# Patient Record
Sex: FEMALE | Race: WHITE | NOT HISPANIC OR LATINO
[De-identification: names, ages, dates, MRNs, and addresses within clinical notes are randomized per-mention and may not be internally consistent; named-entity substitution may affect disease eponyms.]

---

## 2018-06-08 ENCOUNTER — RESULT REVIEW (OUTPATIENT)
Age: 53
End: 2018-06-08

## 2019-06-28 ENCOUNTER — RESULT REVIEW (OUTPATIENT)
Age: 54
End: 2019-06-28

## 2019-11-13 PROBLEM — Z00.00 ENCOUNTER FOR PREVENTIVE HEALTH EXAMINATION: Status: ACTIVE | Noted: 2019-11-13

## 2019-11-18 ENCOUNTER — APPOINTMENT (OUTPATIENT)
Dept: OTOLARYNGOLOGY | Facility: CLINIC | Age: 54
End: 2019-11-18
Payer: COMMERCIAL

## 2019-11-18 VITALS
HEIGHT: 64 IN | BODY MASS INDEX: 23.05 KG/M2 | HEART RATE: 82 BPM | WEIGHT: 135 LBS | DIASTOLIC BLOOD PRESSURE: 96 MMHG | SYSTOLIC BLOOD PRESSURE: 152 MMHG

## 2019-11-18 DIAGNOSIS — H90.3 SENSORINEURAL HEARING LOSS, BILATERAL: ICD-10-CM

## 2019-11-18 DIAGNOSIS — H81.20 VESTIBULAR NEURONITIS, UNSPECIFIED EAR: ICD-10-CM

## 2019-11-18 DIAGNOSIS — M26.609 UNSPECIFIED TEMPOROMANDIBULAR JOINT DISORDER: ICD-10-CM

## 2019-11-18 DIAGNOSIS — H92.01 OTALGIA, RIGHT EAR: ICD-10-CM

## 2019-11-18 PROCEDURE — 99203 OFFICE O/P NEW LOW 30 MIN: CPT | Mod: 25

## 2019-11-18 PROCEDURE — 92557 COMPREHENSIVE HEARING TEST: CPT

## 2019-11-18 PROCEDURE — 92567 TYMPANOMETRY: CPT

## 2019-11-18 PROCEDURE — 92563 TONE DECAY HEARING TEST: CPT

## 2019-11-18 NOTE — ASSESSMENT
[FreeTextEntry1] : Patient with balance issues.  Negative exam except likely mild tmj.  Recommended vng and pending result may need imaging or neurology eval.  Also discussed warm compresses for tmj and advil.  If all negative also feel problem may be mild vestibular neuronitis.  follow up in one month

## 2019-11-18 NOTE — CONSULT LETTER
[Dear  ___] : Dear  [unfilled], [Consult Letter:] : I had the pleasure of evaluating your patient, [unfilled]. [Please see my note below.] : Please see my note below. [Consult Closing:] : Thank you very much for allowing me to participate in the care of this patient.  If you have any questions, please do not hesitate to contact me. [FreeTextEntry3] : Sincerely,\par \par Reji De La Rosa MD., FACS\par

## 2019-11-18 NOTE — PHYSICAL EXAM
[Midline] : trachea located in midline position [Normal] : no rashes [de-identified] : sl tender tmj bilat

## 2019-11-18 NOTE — REVIEW OF SYSTEMS
[Ear Pain] : ear pain [Ear Itch] : ear itch [Dizziness] : dizziness [Vertigo] : vertigo [Recurrent Ear Infections] : recurrent ear infections [Lightheadedness] : lightheadedness [Anxiety] : anxiety [Negative] : Heme/Lymph [Patient Intake Form Reviewed] : Patient intake form was reviewed [FreeTextEntry1] : headache

## 2019-11-18 NOTE — HISTORY OF PRESENT ILLNESS
[de-identified] : c/o ? ear infection in several mos ago - was treated with ear drops.  Self treated one month ago with right ear discomfort.  Dull ache.  No sharp pain. No ear discharge. Does use q tips.  No TM perf or drainage.  Also felt off balance about 2 weeks ago.  No spinning.  Noted primarily with driving.   Mod anxious.  No blackout or diplopia. No change in hearing.   No true spinning.  No problems turning in bed.  \par Also told in past of ? TMJ.  No gum chewing.  ? no tooth grinding.

## 2019-11-18 NOTE — REASON FOR VISIT
[Initial Consultation] : an initial consultation for [FreeTextEntry2] : ear pain, dizziness, possible TMJ

## 2019-12-17 ENCOUNTER — APPOINTMENT (OUTPATIENT)
Dept: OTOLARYNGOLOGY | Facility: CLINIC | Age: 54
End: 2019-12-17
Payer: COMMERCIAL

## 2019-12-17 PROCEDURE — 92537 CALORIC VSTBLR TEST W/REC: CPT

## 2019-12-23 ENCOUNTER — APPOINTMENT (OUTPATIENT)
Dept: OTOLARYNGOLOGY | Facility: CLINIC | Age: 54
End: 2019-12-23

## 2019-12-29 ENCOUNTER — FORM ENCOUNTER (OUTPATIENT)
Age: 54
End: 2019-12-29

## 2019-12-30 ENCOUNTER — OUTPATIENT (OUTPATIENT)
Dept: OUTPATIENT SERVICES | Facility: HOSPITAL | Age: 54
LOS: 1 days | End: 2019-12-30
Payer: COMMERCIAL

## 2019-12-30 ENCOUNTER — APPOINTMENT (OUTPATIENT)
Dept: MRI IMAGING | Facility: CLINIC | Age: 54
End: 2019-12-30
Payer: COMMERCIAL

## 2019-12-30 DIAGNOSIS — Z00.8 ENCOUNTER FOR OTHER GENERAL EXAMINATION: ICD-10-CM

## 2019-12-30 DIAGNOSIS — H90.3 SENSORINEURAL HEARING LOSS, BILATERAL: ICD-10-CM

## 2019-12-30 DIAGNOSIS — R42 DIZZINESS AND GIDDINESS: ICD-10-CM

## 2019-12-30 PROCEDURE — 70553 MRI BRAIN STEM W/O & W/DYE: CPT | Mod: 26

## 2019-12-30 PROCEDURE — 70553 MRI BRAIN STEM W/O & W/DYE: CPT

## 2020-01-23 ENCOUNTER — APPOINTMENT (OUTPATIENT)
Dept: NEUROSURGERY | Facility: CLINIC | Age: 55
End: 2020-01-23
Payer: COMMERCIAL

## 2020-01-23 DIAGNOSIS — R42 DIZZINESS AND GIDDINESS: ICD-10-CM

## 2020-01-23 PROCEDURE — 99203 OFFICE O/P NEW LOW 30 MIN: CPT

## 2020-01-24 VITALS
OXYGEN SATURATION: 100 % | WEIGHT: 145.5 LBS | TEMPERATURE: 98.8 F | HEART RATE: 70 BPM | SYSTOLIC BLOOD PRESSURE: 156 MMHG | BODY MASS INDEX: 24.84 KG/M2 | HEIGHT: 64 IN | DIASTOLIC BLOOD PRESSURE: 87 MMHG

## 2020-01-28 NOTE — REASON FOR VISIT
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

## 2020-01-28 NOTE — CONSULT LETTER
[Dear  ___] : Dear  [unfilled], [Courtesy Letter:] : I had the pleasure of seeing your patient, [unfilled], in my office today. [Sincerely,] : Sincerely, [FreeTextEntry2] : Jonathan Boxer,MD\par [address]\par [place] [address] [FreeTextEntry3] : Robbie Mejia MD, PhD, FRCPSC \par Attending Neurosurgeon \par  of Neurosurgery \par [facility] \par [address] [facility] [facility], [address]nd floor \[place] [address] \par Office: [phone] \par Fax: [phone]\par \par  [FreeTextEntry1] : I have seen your patient Staci Root in my office to evaluate her ongoing problems with persistent dizziness associated with head movement and the identification of a cyst within the brain. This very pleasant otherwise healthy 54-year-old woman works as an . She indicates onset of symptoms approximately 3 months ago. She had right-sided ear pain prior to onset. The patient indicates that she has episodes of dizziness and feeling of vertigo predominantly while driving when she makes sudden rotational movements of the head particularly toward the left-hand side. At times the dizziness can be completely disoriented and but resolves with rest within a few moments. It does not occur with every rotational movement. She has noticed nausea but no vomiting with these events. She denies any morning headaches. She denies any headaches with exertion such as bending or lifting. The patient will experience some visual blurring with the actual event but denies any changes in her vision overall at rest or in her work environment.\par \par The patient has undergone ENT evaluation and a VNG test was performed which interpreted some subtle findings. As a result an MRI scan was performed. 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 There is no evidence of obstruction of CSF outflow to the midline cyst is quite large. There is no transependymal flow. The surrounding brain parenchyma is intact.\par \par The patient has also undergone an ophthalmology evaluation by Dr. Coffey and the patient reports no acute changes in her vision. Pupils were dilated and apparently no abnormalities of the disc were reported.\par \par On examination the patient is in no acute distress. Pupils are equal and reactive to light. On this occasion a simple turning of the head in both directions did not elicit any disequilibrium. There is no upward gaze palsy. The patient did have a few sacades during the left lateral gaze without nystagmus. The patient also demonstrated difficulty with accommodation. In particular the right eye would bounce lateral with closer/near focus. There was no pronator drift. Romberg test was negative. The patient did have difficulty with in line tight rope balance.\par \par I have had an extended discussion with the patient regarding the pathophysiology natural history of a midline intraventricular cyst. These in general are completely benign and did not require any treatment. However, currently we only have a single image of the brain and did not know if this is a changing anatomy. I have therefore recommended a followup MRI scan in 3 months to determine if there are any acute changes in that interval. We have briefly discussed treatment options should there be any consideration of expansion of the cyst or ventricular occlusion. In general we would use an endoscopic approach for minimally invasive fenestration.\par \par It is my impression given the time line of presentation and symptom profile that this represents a primary labyrinth problem which may be viral in origin. I have provided the patient with a prescription for evaluation and treatment with a vestibular therapy specialist. It is not clear to me why the symptoms are more profound while driving and during the evening hours when it is dark. In order to definitively rule out an ophthalmologic problem I have asked for the patient to be evaluated by and neural ophthalmologist. I will see the patient again in 3 months after her repeat MRI scan. The patient may contact me at any time should her symptoms progress or change.\par \par Thank you for very kindly including me in the evaluation and treatment of your patient. Please do not hesitate to contact me should any questions or concerns regarding the patient's diagnosis, this evaluation, or recommendation for further evaluation and testing.

## 2020-01-28 NOTE — REVIEW OF SYSTEMS
[Vertigo] : vertigo [Negative] : Integumentary [FreeTextEntry4] : Ear pain [FreeTextEntry3] : Intolerance to light

## 2020-05-06 ENCOUNTER — APPOINTMENT (OUTPATIENT)
Dept: NEUROSURGERY | Facility: CLINIC | Age: 55
End: 2020-05-06

## 2020-08-07 ENCOUNTER — RESULT REVIEW (OUTPATIENT)
Age: 55
End: 2020-08-07

## 2021-01-17 ENCOUNTER — TRANSCRIPTION ENCOUNTER (OUTPATIENT)
Age: 56
End: 2021-01-17

## 2023-01-26 ENCOUNTER — APPOINTMENT (OUTPATIENT)
Dept: NEUROSURGERY | Facility: CLINIC | Age: 58
End: 2023-01-26
Payer: COMMERCIAL

## 2023-01-26 VITALS
HEART RATE: 66 BPM | OXYGEN SATURATION: 98 % | TEMPERATURE: 98 F | DIASTOLIC BLOOD PRESSURE: 89 MMHG | BODY MASS INDEX: 23.05 KG/M2 | HEIGHT: 64 IN | SYSTOLIC BLOOD PRESSURE: 142 MMHG | WEIGHT: 135 LBS

## 2023-01-26 PROCEDURE — 99203 OFFICE O/P NEW LOW 30 MIN: CPT

## 2023-01-26 RX ORDER — ESTRADIOL AND LEVONORGESTREL .045; .015 MG/D; MG/D
PATCH TRANSDERMAL
Refills: 0 | Status: ACTIVE | COMMUNITY

## 2023-02-06 ENCOUNTER — OUTPATIENT (OUTPATIENT)
Dept: OUTPATIENT SERVICES | Facility: HOSPITAL | Age: 58
LOS: 1 days | End: 2023-02-06
Payer: COMMERCIAL

## 2023-02-06 ENCOUNTER — APPOINTMENT (OUTPATIENT)
Dept: MRI IMAGING | Facility: CLINIC | Age: 58
End: 2023-02-06
Payer: COMMERCIAL

## 2023-02-06 DIAGNOSIS — G93.0 CEREBRAL CYSTS: ICD-10-CM

## 2023-02-06 PROCEDURE — 70551 MRI BRAIN STEM W/O DYE: CPT

## 2023-02-06 PROCEDURE — 70551 MRI BRAIN STEM W/O DYE: CPT | Mod: 26

## 2023-02-22 ENCOUNTER — APPOINTMENT (OUTPATIENT)
Dept: NEUROSURGERY | Facility: CLINIC | Age: 58
End: 2023-02-22
Payer: COMMERCIAL

## 2023-02-22 DIAGNOSIS — R42 DIZZINESS AND GIDDINESS: ICD-10-CM

## 2023-02-22 DIAGNOSIS — Z82.49 FAMILY HISTORY OF ISCHEMIC HEART DISEASE AND OTHER DISEASES OF THE CIRCULATORY SYSTEM: ICD-10-CM

## 2023-02-22 DIAGNOSIS — Z78.9 OTHER SPECIFIED HEALTH STATUS: ICD-10-CM

## 2023-02-22 DIAGNOSIS — G93.0 CEREBRAL CYSTS: ICD-10-CM

## 2023-02-22 DIAGNOSIS — Z83.49 FAMILY HISTORY OF OTHER ENDOCRINE, NUTRITIONAL AND METABOLIC DISEASES: ICD-10-CM

## 2023-02-22 PROCEDURE — 99213 OFFICE O/P EST LOW 20 MIN: CPT

## 2023-02-22 NOTE — REVIEW OF SYSTEMS
[Vertigo] : vertigo [Anxiety] : anxiety [Negative] : Heme/Lymph [de-identified] : Headaches [FreeTextEntry3] : Vision Changes

## 2023-02-22 NOTE — CONSULT LETTER
[Dear  ___] : Dear  [unfilled], [Courtesy Letter:] : I had the pleasure of seeing your patient, [unfilled], in my office today. 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  As you may recall patient was undergoing investigation for dizziness in 2019. She had underwent an mri of the head which revealed this benign and incidental velum interpositum cyst.  Patient indicates after her last office visit she was evaluated by a neuro-ophthalmologist who provided her with prism lenses which provided her significant benefit in her symptoms. She reports on Jan 5, 2023 she developed symptoms similar to what she had experienced in 2019 prior to her MRI of the head.  Patient reports on 1/5/2023 she developed a severe headache.  She was not obtaining any benefit with Advil.  She reports this has significantly improved.  She now experiences intermittent dull headaches.  She reports also experiencing a sensation of fogginess and lightheadedness.  This has not improved.  She denies any dizziness, reading difficulties, light sensitivity, weakness, seizure-like activity, bowel or bladder dysfunction, memory difficulties, or vision changes.\par \par There is no recent imaging to review with the patient.\par \par Patient is alert and oriented.  No distress noted.  Cranial nerves are intact.  Negative Romberg's.  Negative pronator drift.  Strength to bilateral upper and lower extremities 5/5.  Negative nystagmus.  Negative saccades.  No symptoms elicited with head turns on fixed point.  Vestibular ocular reflex intact.  Patient is able to tandem walk.\par \par I provided the patient with a prescription for an MRI of the head without contrast for evaluation of her known intraventricular cyst.   Patient was scheduled a follow-up appointment with Dr. Mejia  however patient will call once MRI images are complete.  Patient aware to call with any further questions or concerns or with any new or worsening symptoms. [FreeTextEntry3] : Shanna Mills, MSN, FNP-BC\par Nurse Practitioner\par Neurosurgery\par [address] [place] [address], 2nd floor \[place] [address] \par Office: [phone] \par Fax: [phone]\par \par

## 2023-02-24 VITALS
BODY MASS INDEX: 23.05 KG/M2 | HEART RATE: 72 BPM | TEMPERATURE: 97.7 F | RESPIRATION RATE: 12 BRPM | SYSTOLIC BLOOD PRESSURE: 157 MMHG | OXYGEN SATURATION: 98 % | DIASTOLIC BLOOD PRESSURE: 86 MMHG | HEIGHT: 64 IN | WEIGHT: 135 LBS

## 2023-02-26 PROBLEM — R42 LIGHTHEADEDNESS: Status: ACTIVE | Noted: 2023-01-26

## 2023-03-06 PROBLEM — G93.0 CYST OF BRAIN: Status: ACTIVE | Noted: 2023-01-26

## 2023-03-06 NOTE — CONSULT LETTER
[Dear  ___] : Dear  [unfilled], [Courtesy Letter:] : I had the pleasure of seeing your patient, [unfilled], in my office today. 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  As you may recall patient was undergoing investigation for dizziness in 2019. She had  an MRI of the head in 2019 which revealed this benign and incidental velum interpositum cyst.  Patient reports on 1/5/2023 she developed a severe headache.  She was not obtaining any benefit with Advil.  She now experiences intermittent dull headaches.  She reports also experiencing a sensation of fogginess, dizziness, and lightheadedness.  This has not improved.  She denies any reading difficulties, light sensitivity, weakness, seizure-like activity, bowel or bladder dysfunction, memory difficulties, or vision changes.\par \par  I have reviewed with the patient the images from [facility] dated 2/6/2023.  brain MRI with contrast again shows a cyst of the velum interpositum.  The images show the aqueduct is open with normal flow and the labyrinth system is normal.  There is no mass or edema and no downward herniation.   No hemorrhage.  No change in size or shape.    \par \par Patient is alert and oriented.  No distress noted.  Cranial nerves are intact.  Negative Romberg's.  Negative pronator drift.  Strength to bilateral upper and lower extremities 5/5.  Negative nystagmus.  Negative saccades.  No symptoms elicited with head turns on fixed point.  Vestibular ocular reflex intact.  Patient is able to tandem walk.\par \par the congenital/ developmental benign cyst  has been stable for a number of years since 2019.  Her current headache and dizziness symptoms are not correlated the cyst.  The symptoms will be addressed with her PCP and GYN that could be related to her current state of menopause.  No surgical intervention is warranted.   The patient will be seen by her ophthalmologist for papilledema assessment.  she will contact the office for further evaluation as needed.  If she chooses she will contact the office in one years time for a follow up  MRI  of the brain with contrast.  Otherwise, there is no need for additional follow up images.    \par \par Thank you for very kindly including me in the evaluation and treatment of your patient.  Please do not hesitate to contact me should you have any concerns or questions regarding this evaluation or proposed follow-up treatment and evaluation plan. [FreeTextEntry3] : Robbie Mejia MD, PhD, FRCPSC \par Attending Neurosurgeon \par  of Neurosurgery \par [facility] \par [address], [address]nd floor \[place] [address] \par Office: [phone] \par Fax: [phone]\par \par

## 2023-03-06 NOTE — REASON FOR VISIT
[Follow-Up: _____] : a [unfilled] follow-up visit [FreeTextEntry1] : Dizziness and HA  - a developmental brain cyst

## 2023-03-06 NOTE — REVIEW OF SYSTEMS
[Dizziness] : dizziness [Migraine Headache] : migraine headaches [Negative] : Heme/Lymph [de-identified] : balance issues

## 2023-12-13 NOTE — RESULTS/DATA
Addended by: ARIELLA ANDRES on: 12/12/2023 08:49 PM     Modules accepted: Orders     [FreeTextEntry1] :    MR Head No Cont             Final  No Documents Attached       EXAM: [id number] - MR BRAIN  - ORDERED BY: LEYDI LANGSTON   PROCEDURE DATE:  02/06/2023    INTERPRETATION:  CLINICAL INFORMATION: Velum interpositum cyst, evaluate for progression. DMR. Ordering Dxs: G93.0 Cerebral cysts /// Admitting Dxs: G93.0 CEREBRAL CYSTS.  TECHNIQUE: Multiplanar, multisequence brain MRI was performed without intravenous contrast.  COMPARISON: MRI brain from 12/30/2019.  FINDINGS:  There is a cavum septum interpositum cyst measuring 3.0 x 3.0 x 2.5 cm that is unchanged from prior accounting for differences in imaging technique.  There is no evidence of acute infarct. There are no foci of susceptibility artifact to suggest hemorrhage. The ventricles are normal in size for patient's age.  Flow voids of the major intracranial vessels at the skull base follow expected course and contour.  The paranasal sinuses demonstrate no signal abnormality. The mastoids demonstrate no signal abnormality.  Bilateral orbits are within normal limits.   IMPRESSION: 1.  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 2.  No acute infarct, hemorrhage, or mass effect.  --- End of Report ---      KAYLEE LANGLEY DO; Resident Radiologist This document has been electronically signed. GRACE LLANES MD; Attending Radiologist This document has been electronically signed. Feb 6 2023 10:54AM      Ordered by: LEYDI LANGSTON       Collected/Examined: [id number] 08:20AM        Verification Required       Stage: Final         Performed at: [facility] [place]       Resulted: [id number] 08:52AM       Last Updated: [id number] 10:58AM       Accession: [id number]

## 2024-01-31 ENCOUNTER — APPOINTMENT (OUTPATIENT)
Dept: OTOLARYNGOLOGY | Facility: CLINIC | Age: 59
End: 2024-01-31
Payer: COMMERCIAL

## 2024-01-31 VITALS — BODY MASS INDEX: 24.75 KG/M2 | HEIGHT: 64 IN | TEMPERATURE: 97.9 F | WEIGHT: 145 LBS

## 2024-01-31 DIAGNOSIS — H61.21 IMPACTED CERUMEN, RIGHT EAR: ICD-10-CM

## 2024-01-31 DIAGNOSIS — H93.8X1 OTHER SPECIFIED DISORDERS OF RIGHT EAR: ICD-10-CM

## 2024-01-31 DIAGNOSIS — H90.3 SENSORINEURAL HEARING LOSS, BILATERAL: ICD-10-CM

## 2024-01-31 DIAGNOSIS — H69.91 UNSPECIFIED EUSTACHIAN TUBE DISORDER, RIGHT EAR: ICD-10-CM

## 2024-01-31 PROCEDURE — G0268 REMOVAL OF IMPACTED WAX MD: CPT

## 2024-01-31 PROCEDURE — 92567 TYMPANOMETRY: CPT

## 2024-01-31 PROCEDURE — 92557 COMPREHENSIVE HEARING TEST: CPT

## 2024-01-31 PROCEDURE — 99204 OFFICE O/P NEW MOD 45 MIN: CPT | Mod: 25

## 2024-01-31 RX ORDER — FLUTICASONE PROPIONATE 50 UG/1
50 SPRAY, METERED NASAL
Refills: 0 | Status: ACTIVE | COMMUNITY

## 2024-01-31 RX ORDER — DM/PSEUDOEPHED/ACETAMINOPHEN 15-30-500
TABLET ORAL
Refills: 0 | Status: ACTIVE | COMMUNITY

## 2024-01-31 NOTE — REVIEW OF SYSTEMS
[Negative] : Heme/Lymph [de-identified] : feels like fluid in ear, first infection happened in October 2023

## 2024-01-31 NOTE — HISTORY OF PRESENT ILLNESS
[de-identified] : Had sx of URi 3 mos ago - had ear discomfort - saw PA - told abx.  One month later and still had sx - had abx again.   ONe month ago had sx again.  Saw primary care - saw primary care - no infection - ? fluid - had flonase and sudafed.  No hearing change.   No hx of freq infections.   NOtes issue with right ear usually.

## 2024-01-31 NOTE — PHYSICAL EXAM
[Midline] : trachea located in midline position [Normal] : no rashes [de-identified] : xs cerumen right ear; left clear [de-identified] : after cerumen removal

## 2024-01-31 NOTE — ASSESSMENT
[FreeTextEntry1] : Patient with history of of 2 recent ear infections treated with abx - ? also fluid in  ears seen by primary care.  Here for recheck.  Ears clear today  - audio and tymp normal - feel likely ETD and recommended flonase and follow up as needed.   xs cerumen was removed from right ear

## 2024-09-30 ENCOUNTER — APPOINTMENT (OUTPATIENT)
Age: 59
End: 2024-09-30

## 2024-09-30 DIAGNOSIS — Z00.00 ENCOUNTER FOR GENERAL ADULT MEDICAL EXAMINATION W/OUT ABNORMAL FINDINGS: ICD-10-CM

## 2024-09-30 DIAGNOSIS — Z23 ENCOUNTER FOR IMMUNIZATION: ICD-10-CM
